# Patient Record
Sex: MALE | Race: BLACK OR AFRICAN AMERICAN | NOT HISPANIC OR LATINO | URBAN - METROPOLITAN AREA
[De-identification: names, ages, dates, MRNs, and addresses within clinical notes are randomized per-mention and may not be internally consistent; named-entity substitution may affect disease eponyms.]

---

## 2023-12-27 ENCOUNTER — OFFICE VISIT (OUTPATIENT)
Dept: URBAN - METROPOLITAN AREA CLINIC 118 | Facility: CLINIC | Age: 58
End: 2023-12-27
Payer: COMMERCIAL

## 2023-12-27 VITALS
HEIGHT: 70 IN | BODY MASS INDEX: 38.94 KG/M2 | WEIGHT: 272 LBS | SYSTOLIC BLOOD PRESSURE: 137 MMHG | HEART RATE: 77 BPM | DIASTOLIC BLOOD PRESSURE: 73 MMHG | TEMPERATURE: 97.3 F

## 2023-12-27 DIAGNOSIS — K62.5 RECTAL BLEEDING: ICD-10-CM

## 2023-12-27 DIAGNOSIS — K59.03 DRUG-INDUCED CONSTIPATION: ICD-10-CM

## 2023-12-27 DIAGNOSIS — K62.89 RECTAL PAIN: ICD-10-CM

## 2023-12-27 DIAGNOSIS — K59.09 CHANGE IN BOWEL MOVEMENTS INTERMITTENT CONSTIPATION. URGENCY IN THE MORNING.: ICD-10-CM

## 2023-12-27 PROCEDURE — 99203 OFFICE O/P NEW LOW 30 MIN: CPT | Performed by: INTERNAL MEDICINE

## 2023-12-27 PROCEDURE — 99203 OFFICE O/P NEW LOW 30 MIN: CPT

## 2023-12-27 RX ORDER — POLYETHYLENE GLYCOL 3350, SODIUM SULFATE ANHYDROUS, SODIUM BICARBONATE, SODIUM CHLORIDE, POTASSIUM CHLORIDE 236; 22.74; 6.74; 5.86; 2.97 G/4L; G/4L; G/4L; G/4L; G/4L
4000 ML POWDER, FOR SOLUTION ORAL ONCE
Qty: 1 | Refills: 0 | OUTPATIENT
Start: 2023-12-27 | End: 2023-12-28

## 2023-12-27 NOTE — HPI-TODAY'S VISIT:
Patient presents today for evaluation of new onset constipation s/p left knee replacement 2 weeks ago having taken Oxycodone for the pain. States due to decreased frequency of BMs, he stopped opioid 1 week ago and has been taking OTC Tylenol for his pain. Denies previous hx of constipation and prior to knee surgery had regular formed BMs 2xs/day. Pt has tried Senna and Miralax daily for the past week, resulting in loose stools, but still feels sense of incomplete evacuation. Associated rectal irritation and BRBPR with wiping after multiple BMs with onset also 2 weeks ago. No blood in stool or melena. No fever/chills, N/V, or abdominal pain. Prior colonoscopy 2020 with [facility], unsure if polyps. No FHX colon cancer. No prior abdominal surgeries.

## 2023-12-27 NOTE — PHYSICAL EXAM GASTROINTESTINAL
Abdomen: soft and nondistended, no visible masses, nontender to palpation, no guarding or rigidity, no rebound tenderness, no palpable masses, normal bowel sounds. Liver and Spleen: no hepatosplenomegaly, liver nontender. Rectal: Deferred

## 2023-12-27 NOTE — PHYSICAL EXAM CONSTITUTIONAL:
Well developed, well nourished, in no acute distress, difficulty ambulating with walker due to left knee pain, normal communication ability

## 2023-12-28 ENCOUNTER — TELEPHONE ENCOUNTER (OUTPATIENT)
Dept: URBAN - METROPOLITAN AREA CLINIC 118 | Facility: CLINIC | Age: 58
End: 2023-12-28

## 2024-02-26 ENCOUNTER — LAB (OUTPATIENT)
Dept: URBAN - METROPOLITAN AREA CLINIC 88 | Facility: CLINIC | Age: 59
End: 2024-02-26

## 2024-02-26 ENCOUNTER — OV EP (OUTPATIENT)
Dept: URBAN - METROPOLITAN AREA CLINIC 88 | Facility: CLINIC | Age: 59
End: 2024-02-26
Payer: COMMERCIAL

## 2024-02-26 VITALS
TEMPERATURE: 97.2 F | WEIGHT: 265.4 LBS | HEART RATE: 57 BPM | DIASTOLIC BLOOD PRESSURE: 77 MMHG | BODY MASS INDEX: 37.99 KG/M2 | SYSTOLIC BLOOD PRESSURE: 140 MMHG | OXYGEN SATURATION: 98 % | HEIGHT: 70 IN

## 2024-02-26 DIAGNOSIS — K21.9 GASTROESOPHAGEAL REFLUX DISEASE, UNSPECIFIED WHETHER ESOPHAGITIS PRESENT: ICD-10-CM

## 2024-02-26 DIAGNOSIS — K59.03 DRUG INDUCED CONSTIPATION: ICD-10-CM

## 2024-02-26 DIAGNOSIS — R14.0 POSTPRANDIAL BLOATING: ICD-10-CM

## 2024-02-26 PROBLEM — 235595009: Status: ACTIVE | Noted: 2024-02-26

## 2024-02-26 PROCEDURE — 99214 OFFICE O/P EST MOD 30 MIN: CPT | Performed by: NURSE PRACTITIONER

## 2024-02-26 RX ORDER — PANTOPRAZOLE SODIUM 40 MG/1
1 TABLET TABLET, DELAYED RELEASE ORAL
Qty: 90 | Refills: 1 | OUTPATIENT
Start: 2024-02-26

## 2024-02-26 RX ORDER — LINACLOTIDE 145 UG/1
1 CAPSULE AT LEAST 30 MINUTES BEFORE THE FIRST MEAL OF THE DAY ON AN EMPTY STOMACH CAPSULE, GELATIN COATED ORAL ONCE A DAY
Qty: 90 | Refills: 3 | OUTPATIENT
Start: 2024-02-26 | End: 2025-02-20

## 2024-02-26 RX ORDER — LINACLOTIDE 72 UG/1
1 CAPSULE AT LEAST 30 MINUTES BEFORE THE FIRST MEAL OF THE DAY ON AN EMPTY STOMACH CAPSULE, GELATIN COATED ORAL ONCE A DAY
Qty: 90 | Refills: 3 | OUTPATIENT
Start: 2024-02-26 | End: 2025-02-20

## 2024-02-26 NOTE — HPI-OTHER HISTORIES
---------------------------------------------------------------- Last office note 12/02/2023 by Fernando Jiang PA-C: Patient presents today for evaluation of new onset constipation s/p left knee replacement 2 weeks ago having taken Oxycodone for the pain. States due to decreased frequency of BMs, he stopped opioid 1 week ago and has been taking OTC Tylenol for his pain. Denies previous hx of constipation and prior to knee surgery had regular formed BMs 2xs/day. Pt has tried Senna and Miralax daily for the past week, resulting in loose stools, but still feels sense of incomplete evacuation. Associated rectal irritation and BRBPR with wiping after multiple BMs with onset also 2 weeks ago. No blood in stool or melena. No fever/chills, N/V, or abdominal pain. Prior colonoscopy 2020 with [facility], unsure if polyps. No FHX colon cancer. No prior abdominal surgeries.

## 2024-02-26 NOTE — HPI-TODAY'S VISIT:
Patient presents today for evaluation of increase post-prandial abdominal bloating, constipation and increase in reflux.  Underwent left knee replacement in December 2023 with constipation increasing after this due to opioid therapy.  Was last evaluated by our [place] office in December 2023.  Prescribed Colyte for bowel cleansing and samples of Linzess 72 mcg.  Continued to take daily pain medication until 2nd week in January 2024.  Defecation occurs once every other day with varying degree of complete sense of evacuation of stool.   Post prandial bloating does improve with defecation.  Once samples of Linzess 72 mcg was complete, denies use of other medication to manage constipation.  States his normaly defecation occurs 1-2 times a day.  Also voices increase in reflux as well.  Takes famotidine 20 mg as needed to mange reflux.  Takes NSAIDs as needed.  Denies prior EGD.

## 2024-04-09 ENCOUNTER — OV EP (OUTPATIENT)
Dept: URBAN - METROPOLITAN AREA CLINIC 88 | Facility: CLINIC | Age: 59
End: 2024-04-09

## 2024-04-09 RX ORDER — LINACLOTIDE 145 UG/1
1 CAPSULE AT LEAST 30 MINUTES BEFORE THE FIRST MEAL OF THE DAY ON AN EMPTY STOMACH CAPSULE, GELATIN COATED ORAL ONCE A DAY
Qty: 90 | Refills: 3 | Status: ACTIVE | COMMUNITY
Start: 2024-02-26 | End: 2025-02-20

## 2024-04-09 RX ORDER — PANTOPRAZOLE SODIUM 40 MG/1
1 TABLET TABLET, DELAYED RELEASE ORAL
Qty: 90 | Refills: 1 | Status: ACTIVE | COMMUNITY
Start: 2024-02-26